# Patient Record
Sex: FEMALE | Race: WHITE | Employment: UNEMPLOYED | URBAN - METROPOLITAN AREA
[De-identification: names, ages, dates, MRNs, and addresses within clinical notes are randomized per-mention and may not be internally consistent; named-entity substitution may affect disease eponyms.]

---

## 2023-04-27 ENCOUNTER — OFFICE VISIT (OUTPATIENT)
Dept: ENT CLINIC | Age: 23
End: 2023-04-27
Payer: COMMERCIAL

## 2023-04-27 VITALS
WEIGHT: 145 LBS | TEMPERATURE: 97.7 F | HEART RATE: 63 BPM | DIASTOLIC BLOOD PRESSURE: 67 MMHG | SYSTOLIC BLOOD PRESSURE: 105 MMHG | HEIGHT: 60 IN | BODY MASS INDEX: 28.47 KG/M2

## 2023-04-27 DIAGNOSIS — J03.90 TONSILLITIS: Primary | ICD-10-CM

## 2023-04-27 DIAGNOSIS — J30.2 SEASONAL ALLERGIES: ICD-10-CM

## 2023-04-27 PROCEDURE — 99203 OFFICE O/P NEW LOW 30 MIN: CPT | Performed by: STUDENT IN AN ORGANIZED HEALTH CARE EDUCATION/TRAINING PROGRAM

## 2023-04-27 ASSESSMENT — ENCOUNTER SYMPTOMS
RHINORRHEA: 0
SHORTNESS OF BREATH: 0
EYE PAIN: 0
SORE THROAT: 1
NAUSEA: 0
COUGH: 0
VOMITING: 0

## 2023-04-27 NOTE — PROGRESS NOTES
female with     1. Tonsillitis  She should continue her amoxicillin and prednisone. Cultures are pending. If she does not have improvement with amoxicillin we will switch her to doxycycline. She also did not have a monotest.  She does not know if she is ever had mono. 2. Seasonal allergies  Patient continue her Claritin    Follow-up as needed. Medical Decision Making:   The following items were considered in medical decision making:  Independent review of images  Review / order clinical lab tests  Review / order radiology tests  Decision to obtain old records

## 2023-05-01 ENCOUNTER — TELEPHONE (OUTPATIENT)
Dept: ENT CLINIC | Age: 23
End: 2023-05-01

## 2023-05-01 RX ORDER — DOXYCYCLINE HYCLATE 100 MG
100 TABLET ORAL 2 TIMES DAILY
Qty: 20 TABLET | Refills: 0 | Status: SHIPPED | OUTPATIENT
Start: 2023-05-01 | End: 2023-05-11

## 2023-05-01 NOTE — TELEPHONE ENCOUNTER
Pt mom calling [facility] culture results were phoned into mom on Friday - \"Streptococcus pheumoniae\"   [facility] Fuller Hopes - I called to verify culture results and they will fax it to us also. Last day of steroid today, amoxicillin not working. she's been on it for 5 days   Still has swelling in her neck and has white patches still back there. Flying to [facility] at the end of the week     Mom states she was told to call with her results Monday - they are requesting another antibiotic be sent in to get started ASAP since she is leaving the state this week.      Preferred pharm - [facility] in [facility] [phone]

## 2023-05-01 NOTE — TELEPHONE ENCOUNTER
Doxycyline sent to [facility] in [facility].  This could be mono which she was not tested for either

## 2024-05-06 ENCOUNTER — OFFICE VISIT (OUTPATIENT)
Dept: ORTHOPEDIC SURGERY | Age: 24
End: 2024-05-06
Payer: COMMERCIAL

## 2024-05-06 ENCOUNTER — HOSPITAL ENCOUNTER (EMERGENCY)
Age: 24
Discharge: HOME | End: 2024-05-06
Attending: EMERGENCY MEDICINE
Payer: COMMERCIAL

## 2024-05-06 ENCOUNTER — APPOINTMENT (OUTPATIENT)
Dept: GENERAL RADIOLOGY | Age: 24
End: 2024-05-06
Payer: COMMERCIAL

## 2024-05-06 VITALS
RESPIRATION RATE: 18 BRPM | HEIGHT: 60 IN | SYSTOLIC BLOOD PRESSURE: 107 MMHG | OXYGEN SATURATION: 99 % | WEIGHT: 150.5 LBS | TEMPERATURE: 98.3 F | HEART RATE: 60 BPM | BODY MASS INDEX: 29.55 KG/M2 | DIASTOLIC BLOOD PRESSURE: 64 MMHG

## 2024-05-06 VITALS — BODY MASS INDEX: 29.45 KG/M2 | HEIGHT: 60 IN | WEIGHT: 150 LBS

## 2024-05-06 DIAGNOSIS — S92.351A CLOSED FRACTURE OF BASE OF FIFTH METATARSAL BONE OF RIGHT FOOT, INITIAL ENCOUNTER: Primary | ICD-10-CM

## 2024-05-06 DIAGNOSIS — S93.491A SPRAIN OF ANTERIOR TALOFIBULAR LIGAMENT OF RIGHT ANKLE, INITIAL ENCOUNTER: ICD-10-CM

## 2024-05-06 DIAGNOSIS — S92.354A CLOSED NONDISPLACED FRACTURE OF FIFTH METATARSAL BONE OF RIGHT FOOT, INITIAL ENCOUNTER: Primary | ICD-10-CM

## 2024-05-06 PROCEDURE — 99283 EMERGENCY DEPT VISIT LOW MDM: CPT

## 2024-05-06 PROCEDURE — 73630 X-RAY EXAM OF FOOT: CPT

## 2024-05-06 PROCEDURE — 73620 X-RAY EXAM OF FOOT: CPT

## 2024-05-06 PROCEDURE — 73610 X-RAY EXAM OF ANKLE: CPT

## 2024-05-06 PROCEDURE — 99204 OFFICE O/P NEW MOD 45 MIN: CPT | Performed by: ORTHOPAEDIC SURGERY

## 2024-05-06 PROCEDURE — L4361 PNEUMA/VAC WALK BOOT PRE OTS: HCPCS | Performed by: ORTHOPAEDIC SURGERY

## 2024-05-06 ASSESSMENT — PAIN - FUNCTIONAL ASSESSMENT: PAIN_FUNCTIONAL_ASSESSMENT: 0-10

## 2024-05-06 ASSESSMENT — PAIN SCALES - GENERAL: PAINLEVEL_OUTOF10: 4

## 2024-05-06 NOTE — ED PROVIDER NOTES
Emergency Department Provider Note  Location: [facility] EMERGENCY DEPARTMENT  5/6/2024     Patient Identification  Jose Allen is a 23 y.o. female    Chief Complaint  Ankle Pain (Pt having pain in R ankle after falling down the stairs. )          HPI  (History provided by patient)  Patient is a generally healthy 23-year-old female denies blood thinners or anticoagulants who presents with right-sided ankle and foot pain after mechanical fall down 2 steps today.  Patient reports that she missed the step and fell and either rolled her foot or ankle.  Pain along the lateral aspect of the foot and ankle.  She denies any other injuries hitting her head or any loss of consciousness.  She is able to bear weight denies any numbness or weakness.      Nursing Notes were all reviewed and agreed with, or any disagreements were addressed in the HPI:  Allergies: No Known Allergies    Past medical history:  has no past medical history on file.    Past surgical history:  has no past surgical history on file.    Home medications:   Prior to Admission medications    Not on File       Social history:  reports that she has never smoked. She has never used smokeless tobacco. She reports that she does not drink alcohol and does not use drugs.    Family history:  History reviewed. No pertinent family history.          Exam  ED Triage Vitals   BP Temp Temp src Pulse Resp SpO2 Height Weight   -- -- -- -- -- -- -- --       Physical Exam  Vitals and nursing note reviewed.   Constitutional:       General: She is not in acute distress.     Appearance: She is well-developed.   HENT:      Head: Normocephalic and atraumatic.      Nose: Nose normal. No congestion.   Eyes:      General: No scleral icterus.     Extraocular Movements: Extraocular movements intact.   Cardiovascular:      Rate and Rhythm: Normal rate and regular rhythm.      Heart sounds: No murmur heard.  Pulmonary:      Effort: Pulmonary effort is normal.      Breath sounds:

## 2024-05-06 NOTE — PROGRESS NOTES
ORTHOPAEDIC SURGERY H&P / CONSULTATION NOTE    Chief complaint:   Chief Complaint   Patient presents with    Foot Pain     NP RT FOOT      History of present illness: The patient is a 23 y.o. female with with subjective symptoms of right foot pain. The chief complaint is located at lateral aspect of the right foot and ankle. Duration of symptoms has been for today. The severity of symptoms is rated at 2/10 pain on intake form.  Patient is accompanied by her mother.  She just graduated college.  She states that she was walking out the door today and missed 2 steps and ultimately rolled her ankle.  She states dull throbbing aching pain.  She has been taking ibuprofen.  She went to the emergency room today and ultimately was diagnosed with a base of fifth metatarsal fracture.  She denies previous injury.  She denies therapy.  She denies injections.    The patient has tried the below listed items prior to today's consultation for above listed chief complaint.     +   Over-the-counter anti-inflammatories/prescription medication anti-inflammatory.     -   Physical therapy / guided home exercise program -Short     -   Previous corticosteroid injections    Past medical history:  No past medical history on file.     Past surgical history:  No past surgical history on file.     Allergies:  No Known Allergies      Medications: No current outpatient medications on file.     Social history: Denies IV drug use.    Social History     Socioeconomic History    Marital status: Single     Spouse name: Not on file    Number of children: Not on file    Years of education: Not on file    Highest education level: Not on file   Occupational History    Not on file   Tobacco Use    Smoking status: Never    Smokeless tobacco: Never   Vaping Use    Vaping Use: Never used   Substance and Sexual Activity    Alcohol use: Never    Drug use: Never    Sexual activity: Not on file   Other Topics Concern    Not on file   Social History Narrative

## 2024-06-10 ENCOUNTER — OFFICE VISIT (OUTPATIENT)
Dept: ORTHOPEDIC SURGERY | Age: 24
End: 2024-06-10
Payer: COMMERCIAL

## 2024-06-10 VITALS — BODY MASS INDEX: 29.45 KG/M2 | WEIGHT: 150 LBS | HEIGHT: 60 IN

## 2024-06-10 DIAGNOSIS — S92.351D CLOSED DISPLACED FRACTURE OF FIFTH METATARSAL BONE OF RIGHT FOOT WITH ROUTINE HEALING, SUBSEQUENT ENCOUNTER: Primary | ICD-10-CM

## 2024-06-10 PROCEDURE — 99213 OFFICE O/P EST LOW 20 MIN: CPT | Performed by: ORTHOPAEDIC SURGERY

## 2024-06-10 NOTE — PROGRESS NOTES
FOLLOW UP ORTHOPAEDIC NOTE    The patient follows up today for re-evaluation of right foot. The patient states 0/10 pain.  Patient states that she has been out of the boot for the last 7 days or so.  She has been feeling well otherwise.  She is transition back to regular set of shoes.  She had employed previous treatments as discussed.     PE:  AAOx3  RR  Unlabored breathing  Skin warm and moist  Focused physical examination of the right foot  Nontender to palpation ATFL.  No gross swelling.  Nontender to palpation base of fifth metatarsal  Skin intact throughout  5/5 IP Q H TA G EHL  SILT DP SP LP MP S S  +2 DP pulse    Pertinent radiographs/imaging:  3 view right foot 6/10/2024: Healed base of fifth metatarsal fracture.     Diagnosis Orders   1. Closed displaced fracture of fifth metatarsal bone of right foot with routine healing, subsequent encounter  XR FOOT RIGHT (MIN 3 VIEWS)        Assessment and plan: 23 female with continued subjective symptoms of left foot pain with known, correlating diagnosis of base of right fifth metatarsal pseudo Fleming fracture in the setting of previous ankle sprain.  -Time of 12 minutes was spent coordinating and discussing the clinical findings and diagnostic imaging results as they pertain to the patient's presenting subjective symptoms.  -I had a pleasant discussion with the patient today.  She has done quite well with overall healing of her base of fifth metatarsal fracture.  She is also done well with swelling resolution and symptom resolution with regard to her ankle sprain.  -At this time she may return to low impact activities as tolerated and and slowly increase activities to impact over the next 3 to 4 weeks.  -OTC Tylenol Aleve per bottle as needed discomfort  -All questions answered to the patient's satisfaction and the patient expressed understanding and agreement with the above listed treatment plan  -Follow up in as needed  -Thank you for the clinical consultation and